# Patient Record
Sex: MALE | NOT HISPANIC OR LATINO | URBAN - METROPOLITAN AREA
[De-identification: names, ages, dates, MRNs, and addresses within clinical notes are randomized per-mention and may not be internally consistent; named-entity substitution may affect disease eponyms.]

---

## 2024-02-16 ENCOUNTER — APPOINTMENT (RX ONLY)
Dept: URBAN - METROPOLITAN AREA CLINIC 159 | Facility: CLINIC | Age: 24
Setting detail: DERMATOLOGY
End: 2024-02-16

## 2024-02-16 DIAGNOSIS — L60.3 NAIL DYSTROPHY: ICD-10-CM

## 2024-02-16 PROCEDURE — 99202 OFFICE O/P NEW SF 15 MIN: CPT

## 2024-02-16 PROCEDURE — ? PRESCRIPTION MEDICATION MANAGEMENT

## 2024-02-16 PROCEDURE — ? PRESCRIPTION

## 2024-02-16 PROCEDURE — ? DIAGNOSIS COMMENT

## 2024-02-16 PROCEDURE — ? COUNSELING

## 2024-02-16 RX ORDER — EFINACONAZOLE 100 MG/ML
SOLUTION TOPICAL
Qty: 1 | Refills: 4 | Status: ERX | COMMUNITY
Start: 2024-02-16

## 2024-02-16 RX ADMIN — EFINACONAZOLE: 100 SOLUTION TOPICAL at 00:00

## 2024-02-16 ASSESSMENT — LOCATION SIMPLE DESCRIPTION DERM: LOCATION SIMPLE: LEFT GREAT TOE

## 2024-02-16 ASSESSMENT — LOCATION DETAILED DESCRIPTION DERM: LOCATION DETAILED: LEFT GREAT TOENAIL

## 2024-02-16 ASSESSMENT — LOCATION ZONE DERM: LOCATION ZONE: TOENAIL

## 2024-02-16 NOTE — PROCEDURE: PRESCRIPTION MEDICATION MANAGEMENT
Discontinue Regimen: Ciclopirox solution that was previously prescribed by PCP
Initiate Treatment: Apply thin layer of jublia to toenail qhs, sent to specialty pharmacy [place]
Render In Strict Bullet Format?: No
Detail Level: Zone

## 2024-02-16 NOTE — HPI: OTHER
Condition:: Toenail fungus.
Please Describe Your Condition:: possible toenail fungus on left foot big toe, present for 3 years, has had toenail removed a few months ago, Ciclopirox solution qhs since september 2023, has tried oral treatment but stomach didn't agree with his stomach.

## 2024-02-16 NOTE — PROCEDURE: DIAGNOSIS COMMENT
Detail Level: Simple
Render Risk Assessment In Note?: no
Comment: Advised to go up half a size in sneakers to avoid trauma\\nDiscussed oral treatment vs topical\\nRec. white vinegar soaks

## 2024-11-07 ENCOUNTER — APPOINTMENT (RX ONLY)
Dept: URBAN - METROPOLITAN AREA CLINIC 159 | Facility: CLINIC | Age: 24
Setting detail: DERMATOLOGY
End: 2024-11-07

## 2024-11-07 DIAGNOSIS — R21 RASH AND OTHER NONSPECIFIC SKIN ERUPTION: ICD-10-CM

## 2024-11-07 DIAGNOSIS — B35.1 TINEA UNGUIUM: ICD-10-CM | Status: INADEQUATELY CONTROLLED

## 2024-11-07 PROCEDURE — ? PRESCRIPTION

## 2024-11-07 PROCEDURE — ? COUNSELING

## 2024-11-07 PROCEDURE — ? NAIL CLIPPING FOR PAS

## 2024-11-07 PROCEDURE — ? OTC TREATMENT REGIMEN

## 2024-11-07 PROCEDURE — ? PRESCRIPTION MEDICATION MANAGEMENT

## 2024-11-07 PROCEDURE — 99213 OFFICE O/P EST LOW 20 MIN: CPT

## 2024-11-07 RX ORDER — TACROLIMUS 1 MG/G
OINTMENT TOPICAL
Qty: 30 | Refills: 1 | Status: ERX | COMMUNITY
Start: 2024-11-07

## 2024-11-07 RX ADMIN — TACROLIMUS: 1 OINTMENT TOPICAL at 00:00

## 2024-11-07 ASSESSMENT — LOCATION SIMPLE DESCRIPTION DERM
LOCATION SIMPLE: RIGHT GREAT TOE
LOCATION SIMPLE: GENITALIA
LOCATION SIMPLE: LEFT GREAT TOE

## 2024-11-07 ASSESSMENT — LOCATION ZONE DERM
LOCATION ZONE: TOENAIL
LOCATION ZONE: GENITALIA

## 2024-11-07 ASSESSMENT — LOCATION DETAILED DESCRIPTION DERM
LOCATION DETAILED: LEFT GREAT TOENAIL
LOCATION DETAILED: GENITALIA
LOCATION DETAILED: RIGHT GREAT TOENAIL

## 2024-11-07 NOTE — PROCEDURE: PRESCRIPTION MEDICATION MANAGEMENT
Render In Strict Bullet Format?: No
Initiate Treatment: tacrolimus 0.1 % topical ointment \\nQuantity: 30.0 g  Days Supply: 30\\nSig: Apply to groin area BID x2 wks, PRN flares.
Detail Level: Zone

## 2024-11-07 NOTE — HPI: RASH
Is This A New Presentation, Or A Follow-Up?: Rash
What Type Of Note Output Would You Prefer (Optional)?: Bullet Format
Additional History: Pt states itchy, red, and bumpy rash in groin area x1 mos.\\nPt states tx'd w/aloe, lotriman AF, Head and Shoulders shampoo, shampoo is helping, but not resolved.\\nPt would like fungus on great toes to be looked at, prev tx'd w/jublia and ciclopirox, not resolving.

## 2024-11-07 NOTE — PROCEDURE: OTC TREATMENT REGIMEN
Patient Specific Otc Recommendations (Will Not Stick From Patient To Patient): ===============11/07/2024\\n>CeraVe anti-itch, Sarna camphor menthol, and Dermeleve (online)
Detail Level: Zone

## 2024-11-07 NOTE — PROCEDURE: COUNSELING
Detail Level: Detailed
Patient Specific Counseling (Will Not Stick From Patient To Patient): =================11/7/2024 > \\nErythematous skin on the scrotum; favor this is irritant dermatitis vs red scrotum syndrome\\n>he has completed Lamisil and head and shoulders shampoo treatment there\\n>adivsed to STOP all products on the area, except dove bar soap and tacrolimus \\n>return to clinic if not resolving in next two months\\n>advised to try to break itch scratch cycle and DO NOT apply hydrocortisone \\n=========================================\\nTacrolimus/Pimecrolimus counseling:\\nPatient handout provided, reviewed, and the following were discussed: \\n- In derm, is on-label for atopic derm, and used “off-label” i.e. not FDA approved, for vitiligo, psoriasis, among many others; even though off-label, extended experience in community suggests reasonably safe and effective\\n- Side effects: \\n- Typically, well-tolerated. Most common: itching/burning subsiding with ongoing tx\\n- Black box warning\\n- Explained early studies showed small signal of lymphoma/skin cancer risk, but numerous studies since have failed to show association; is commonly prescribed in derm community\\n- Confirmed the following: \\n@@@Patient is 16 years old or greater\\n@@@Patient denies pregnancy or breast feeding\\n@@@After discussion of risks and benefits, patient would like to start med
Patient Specific Counseling (Will Not Stick From Patient To Patient): ===============11/07/2024\\n>discussed poss dx of malalignment of the great toes\\n>has failed treatment with ciclopirox and Jublia, thus favor MGT\\n>he does NOT want to do oral terbinafine, wants to avoid oral meds

## 2024-11-07 NOTE — PROCEDURE: NAIL CLIPPING FOR PAS
Detail Level: Detailed
Render Path Notes In Note?: No
Lab: [id number]
Lab Facility: [id number]
Billing Type: Third-Party Bill